# Patient Record
(demographics unavailable — no encounter records)

---

## 2024-10-08 NOTE — ASSESSMENT
[FreeTextEntry1] : Patient with history of scleritis ;  lumbar radiculopathy:  The patient will benefit from continued low-dose neuropathic pain medication to help with peripheral neuropathy that maybe stemming from her diabetes as well as DJD of the L-spine; PT encouraged.   Patient will benefit from physical therapy to help with joint mobility and muscle strengthening.  Posterior chain strengthening exercises demonstrated; trigger point injection given for pain relief as patient staves off of NSAID therapy.  Quadriceps strengthening exercises reiterated.    Viscosupplementation has been encouraged to provide additional lubrication and joint support.    As for inflammatory arthritis which was active in the past is now currently quiescent; patient has not had a history of scleritis over the last few years.  She understands the increased risk of cardiovascular events in the setting of inflammatory arthritides and the importance of blood pressure, sugar and lipid control through dietary and exercise modification.  She is in agreement with the above plan and will return in two months' time.

## 2024-10-08 NOTE — PHYSICAL EXAM
[General Appearance - Alert] : alert [General Appearance - In No Acute Distress] : in no acute distress [Sclera] : the sclera and conjunctiva were normal [Examination Of The Oral Cavity] : the lips and gums were normal [Oropharynx] : the oropharynx was normal [Neck Appearance] : the appearance of the neck was normal [Auscultation Breath Sounds / Voice Sounds] : lungs were clear to auscultation bilaterally [Heart Rate And Rhythm] : heart rate was normal and rhythm regular [Edema] : there was no peripheral edema [No Spinal Tenderness] : no spinal tenderness [] : no rash [Skin Lesions] : no skin lesions [Sensation] : the sensory exam was normal to light touch and pinprick [Motor Exam] : the motor exam was normal [Oriented To Time, Place, And Person] : oriented to person, place, and time [Impaired Insight] : insight and judgment were intact [Nail Clubbing] : no clubbing  or cyanosis of the fingernails [Motor Tone] : muscle strength and tone were normal [FreeTextEntry1] : Scattered [place] and Yeni nodes; no active hand synovitis; mild tenderness over the medial joint lines b/l without swelling, tibiotalar motions intact

## 2024-10-08 NOTE — REVIEW OF SYSTEMS
[Dry Eyes] : dryness of the eyes [Arthralgias] : arthralgias [Joint Pain] : joint pain [Joint Stiffness] : joint stiffness [Difficulty Walking] : difficulty walking [Fever] : no fever [Chills] : no chills [Eye Pain] : no eye pain [Red Eyes] : eyes not red [Sore Throat] : no sore throat [Hoarseness] : no hoarseness [Chest Pain] : no chest pain [Palpitations] : no palpitations [Leg Claudication] : no intermittent leg claudication [Lower Ext Edema] : no lower extremity edema [Shortness Of Breath] : no shortness of breath [Cough] : no cough [SOB on Exertion] : no shortness of breath during exertion [Constipation] : no constipation [Diarrhea] : no diarrhea [Joint Swelling] : no joint swelling [Skin Lesions] : no skin lesions [Limb Weakness] : no limb weakness [Depression] : no depression [Muscle Weakness] : no muscle weakness [Feelings Of Weakness] : no feelings of weakness [Easy Bleeding] : no tendency for easy bleeding [Easy Bruising] : no tendency for easy bruising

## 2024-10-08 NOTE — HISTORY OF PRESENT ILLNESS
[FreeTextEntry1] : Patient returns after hiatus and explains worsening pain over the RT lower back; she reports difficulty sleeping on the right side secondary to pain lending to nonrestorative sleep.  Patient explains having been evaluated in urgent care and was given an injection of ketorolac with minimal relief; she reports taking baclofen x 10-day with minimal relief.  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  She was placed on Otrexup which controlled scleritis episodes.  She has since discontinued as symptoms have been at bay.  She finds stress levels have improved since retiring from work.  She otherwise denies visual disturbances, oral ulcers, dyspnea, chest pain, rash,  motor disturbances,  joint swelling or systemic symptoms.

## 2024-10-08 NOTE — PROCEDURE
[Today's Date:] : Date: [unfilled] [Patient] : the patient [Risks] : risks [Benefits] : benefits [Consent Obtained] : written consent was obtained prior to the procedure and is detailed in the patient's record [Therapeutic] : therapeutic [#1 Site: ______] : #1 site identified in the [unfilled] [Ethyl Chloride] : ethyl chloride [Betadine] : betadine solution [Alcohol] : alcohol [25 gauge 1 inch] : A 25 gauge 1 inch needle was used [___ml 1% Lidocaine] : [unfilled] ml of 1% lidocaine [Depomedrol ___ mg] : Depomedrol [unfilled] mg [Tolerated Well] : the patient tolerated the procedure well [No Complications] : there were no complications [Patient Instructed to Call] : patient was instructed to call if redness at site, a decrease in range of motion or an increase in pain is noted after procedure.

## 2024-11-22 NOTE — CARDIOLOGY SUMMARY
[de-identified] : 11/22/24: MARIO ALBERTO [de-identified] : 7/21/12  EF 66%, normal echo [de-identified] : 6/7/13: CAC 81 mild CAD

## 2024-11-22 NOTE — PHYSICAL EXAM
[Well Developed] : well developed [Well Nourished] : well nourished [No Acute Distress] : no acute distress [Normal Conjunctiva] : normal conjunctiva [Normal Venous Pressure] : normal venous pressure [No Carotid Bruit] : no carotid bruit [Normal S1, S2] : normal S1, S2 [No Rub] : no rub [No Gallop] : no gallop [Clear Lung Fields] : clear lung fields [Good Air Entry] : good air entry [No Respiratory Distress] : no respiratory distress  [Soft] : abdomen soft [Non Tender] : non-tender [Normal Gait] : normal gait [No Edema] : no edema [No Cyanosis] : no cyanosis [No Rash] : no rash [No Skin Lesions] : no skin lesions [Moves all extremities] : moves all extremities [No Focal Deficits] : no focal deficits [Normal Speech] : normal speech [Alert and Oriented] : alert and oriented [Normal memory] : normal memory [Murmur] : murmur [de-identified] : 1/6 RUSB

## 2024-11-22 NOTE — HISTORY OF PRESENT ILLNESS
[FreeTextEntry1] : PCP: Dr. Dustin Taylor  66F HTN, HLD, DM, non-obs CAD, inflammatory arthritis who presents to establish cardiac care  eats healthy active  24: Chol 158//HDL 42/LDL 85  chest spasms with asa 81mg  Fam hx: Father - fatal MI age 65  Pregnancy hx: No miscarriages 3 children - one  8 months

## 2024-11-22 NOTE — DISCUSSION/SUMMARY
[FreeTextEntry1] : for additional risk reduction will increase atorvastatin to 40mg, monitor for side effects Vascepa not covered by insurance, will try omega-3 esters for TG lowering for additional cardiovascualr risk reduction recommend transitioning off sulfonylureas if a1c is controlled cont heart healthy lifestyle [EKG obtained to assist in diagnosis and management of assessed problem(s)] : EKG obtained to assist in diagnosis and management of assessed problem(s)